# Patient Record
Sex: MALE | Race: WHITE | Employment: OTHER | URBAN - METROPOLITAN AREA
[De-identification: names, ages, dates, MRNs, and addresses within clinical notes are randomized per-mention and may not be internally consistent; named-entity substitution may affect disease eponyms.]

---

## 2021-06-17 ENCOUNTER — OFFICE VISIT (OUTPATIENT)
Dept: URGENT CARE | Facility: URGENT CARE | Age: 76
End: 2021-06-17
Payer: COMMERCIAL

## 2021-06-17 VITALS
TEMPERATURE: 98.4 F | HEART RATE: 93 BPM | OXYGEN SATURATION: 96 % | SYSTOLIC BLOOD PRESSURE: 128 MMHG | DIASTOLIC BLOOD PRESSURE: 77 MMHG

## 2021-06-17 DIAGNOSIS — S49.91XA SHOULDER INJURY, RIGHT, INITIAL ENCOUNTER: Primary | ICD-10-CM

## 2021-06-17 DIAGNOSIS — S46.911A MUSCLE STRAIN OF SHOULDER REGION, RIGHT, INITIAL ENCOUNTER: ICD-10-CM

## 2021-06-17 PROCEDURE — 99203 OFFICE O/P NEW LOW 30 MIN: CPT | Performed by: NURSE PRACTITIONER

## 2021-06-17 RX ORDER — CYCLOBENZAPRINE HCL 10 MG
10 TABLET ORAL 3 TIMES DAILY PRN
Qty: 21 TABLET | Refills: 0 | Status: SHIPPED | OUTPATIENT
Start: 2021-06-17 | End: 2021-06-24

## 2021-06-18 NOTE — PROGRESS NOTES
SUBJECTIVE:   Andrea Garrett is a 75 year old male presenting with a chief complaint of   Chief Complaint   Patient presents with     Shoulder Injury     Pain in right should. Thumb is numb. Fell, tried to catch himself.       He is a new patient of [facility].    Right shoulder injury and pain.  Onset of symptoms was 1 day(s) ago.  Location: right shoulder  Context:       The injury happened while at home      Mechanism: fell on outstretched and pulled right shoulder forward.       Patient experienced immediate pain  Course of symptoms is same.    Severity moderate  Current and Associated symptoms: Pain  Denies  Swelling, Bruising, Warmth, Redness and Decreased range of motion  Aggravating Factors: movement, repetitive motion and flexion/extension  Therapies to improve symptoms include: none  This is the first time this type of problem has occurred for this patient.     Review of Systems   Musculoskeletal:        Right shoulder injury and pain.   All other systems reviewed and are negative.      Past Medical History:   Diagnosis Date     Dyslipidemia      No family history on file.  Current Outpatient Medications   Medication Sig Dispense Refill     cyclobenzaprine (FLEXERIL) 10 MG tablet Take 1 tablet (10 mg) by mouth 3 times daily as needed for muscle spasms (right shoulder pain) 21 tablet 0     diclofenac (VOLTAREN) 1 % topical gel Apply 2 g topically 4 times daily for 7 days 56 g 0     hydrocodone-acetaminophen (LORCET-HD) 5-500 MG per capsule Take 1 capsule by mouth every 6 hours as needed.       nabumetone (RELAFEN) 500 MG tablet Take 500 mg by mouth 2 times daily.       Omeprazole 20 MG tablet Take 20 mg by mouth daily.       paroxetine (PAXIL) 40 MG tablet Take 40 mg by mouth 2 times daily.       Pseudoephedrine HCl (SUDAFED PO) Take 30 mg by mouth every 8 hours as needed for congestion       simvastatin (ZOCOR) 80 MG tablet Take 80 mg by mouth At Bedtime.       indomethacin (INDOCIN) 25 MG capsule Take  by  mouth. Take 2 capsules by mouth 3 times a day for 3 days then 1 capsule 3 times a day for 7 days    Need to see MD and get creatinine drawn for further refills 39 capsule 0     Social History     Tobacco Use     Smoking status: Never Smoker     Smokeless tobacco: Current User     Types: Chew   Substance Use Topics     Alcohol use: Yes     Comment: occ       OBJECTIVE  /77   Pulse 93   Temp 98.4  F (36.9  C) (Tympanic)   SpO2 96%     Physical Exam  Vitals signs and nursing note reviewed.   Constitutional:       General: He is not in acute distress.     Appearance: He is well-developed. He is not diaphoretic.   HENT:      Head: Normocephalic and atraumatic.      Right Ear: Tympanic membrane and external ear normal.      Left Ear: Tympanic membrane and external ear normal.   Eyes:      Pupils: Pupils are equal, round, and reactive to light.   Neck:      Musculoskeletal: Normal range of motion and neck supple.   Pulmonary:      Effort: Pulmonary effort is normal. No respiratory distress.      Breath sounds: Normal breath sounds.   Musculoskeletal:      Comments: There is tenderness on the anterior right shoulder, it is worse with external rotation of shoulder.  There is slight reduction in the range of motion.  No deformity is noted.  Neurovascular exam is intact.   Lymphadenopathy:      Cervical: No cervical adenopathy.   Skin:     General: Skin is warm and dry.   Neurological:      Mental Status: He is alert.      Cranial Nerves: No cranial nerve deficit.         ASSESSMENT:      ICD-10-CM    1. Shoulder injury, right, initial encounter  S49.91XA cyclobenzaprine (FLEXERIL) 10 MG tablet     diclofenac (VOLTAREN) 1 % topical gel   2. Muscle strain of shoulder region, right, initial encounter  S46.911A cyclobenzaprine (FLEXERIL) 10 MG tablet     diclofenac (VOLTAREN) 1 % topical gel        Medical Decision Making:    Differential Diagnosis:  MS Injury Pain: sprain, fracture, tendonitis, muscle strain, contusion,  dislocation and osteoarthritis    Serious Comorbid Conditions:      PLAN:    MS Injury/Pain  ice, elevate, rest, stretching and Tylenol and muscle relaxant.    Followup:    If not improving or if condition worsens, follow up with your Primary Care Provider    Patient Instructions     Patient Education     Muscle Strain in the Extremities  A muscle strain is a stretching and tearing of muscle fibers. This causes pain, especially when you move that muscle. There may also be some swelling and bruising.  Home care    Keep the hurt area raised above heart level to reduce pain and swelling. This is especially important during the first 48 hours.    Apply an ice pack over the injured area for 15 to 20 minutes every 3 to 6 hours. You should do this for the first 24 to 48 hours. You can make an ice pack by filling a plastic bag that seals at the top with ice cubes and then wrapping it with a thin towel. Be careful not to injure your skin with the ice treatments. Ice should never be applied directly to skin. Continue the use of ice packs for relief of pain and swelling as needed. After 48 hours, apply heat (warm shower or warm bath) for 15 to 20 minutes several times a day, or alternate ice and heat.    You may use over-the-counter pain medicine to control pain, unless another medicine was prescribed. If you have chronic liver or kidney disease or ever had a stomach ulcer or gastrointestinal bleeding, talk with your healthcare provider before using these medicines.    For leg strains: If crutches have been recommended, don t put full weight on the hurt leg until you can do so without pain. You can return to sports when you are able to hop and run on the injured leg without pain.  Follow-up care  Follow up with your healthcare provider, or as advised.  When to seek medical advice  Call your healthcare provider right away if any of these occur:    The toes of the injured leg become swollen, cold, blue, numb, or tingly    Pain  or swelling increases  [employer] last reviewed this educational content on 5/1/2018 2000-2021 [employer]. All rights reserved. This information is not intended as a substitute for professional medical care. Always follow your healthcare professional's instructions.

## 2021-06-18 NOTE — PATIENT INSTRUCTIONS
Patient Education     Muscle Strain in the Extremities  A muscle strain is a stretching and tearing of muscle fibers. This causes pain, especially when you move that muscle. There may also be some swelling and bruising.  Home care    Keep the hurt area raised above heart level to reduce pain and swelling. This is especially important during the first 48 hours.    Apply an ice pack over the injured area for 15 to 20 minutes every 3 to 6 hours. You should do this for the first 24 to 48 hours. You can make an ice pack by filling a plastic bag that seals at the top with ice cubes and then wrapping it with a thin towel. Be careful not to injure your skin with the ice treatments. Ice should never be applied directly to skin. Continue the use of ice packs for relief of pain and swelling as needed. After 48 hours, apply heat (warm shower or warm bath) for 15 to 20 minutes several times a day, or alternate ice and heat.    You may use over-the-counter pain medicine to control pain, unless another medicine was prescribed. If you have chronic liver or kidney disease or ever had a stomach ulcer or gastrointestinal bleeding, talk with your healthcare provider before using these medicines.    For leg strains: If crutches have been recommended, don t put full weight on the hurt leg until you can do so without pain. You can return to sports when you are able to hop and run on the injured leg without pain.  Follow-up care  Follow up with your healthcare provider, or as advised.  When to seek medical advice  Call your healthcare provider right away if any of these occur:    The toes of the injured leg become swollen, cold, blue, numb, or tingly    Pain or swelling increases  [email or web address] last reviewed this educational content on 5/1/2018 2000-2021 [employer]. All rights reserved. This information is not intended as a substitute for professional medical care. Always follow your healthcare professional's  instructions.